# Patient Record
Sex: MALE | Race: WHITE | NOT HISPANIC OR LATINO | Employment: FULL TIME | URBAN - METROPOLITAN AREA
[De-identification: names, ages, dates, MRNs, and addresses within clinical notes are randomized per-mention and may not be internally consistent; named-entity substitution may affect disease eponyms.]

---

## 2017-08-18 DIAGNOSIS — F43.89 REACTION TO CHRONIC STRESS: ICD-10-CM

## 2017-08-18 RX ORDER — ESCITALOPRAM OXALATE 20 MG/1
TABLET ORAL
Qty: 15 TABLET | Refills: 0 | Status: SHIPPED | OUTPATIENT
Start: 2017-08-18 | End: 2017-09-25 | Stop reason: SDUPTHER

## 2017-08-25 DIAGNOSIS — F43.89 REACTION TO CHRONIC STRESS: ICD-10-CM

## 2017-08-25 RX ORDER — ESCITALOPRAM OXALATE 20 MG/1
TABLET ORAL
Qty: 15 TABLET | Refills: 0 | OUTPATIENT
Start: 2017-08-25

## 2017-09-25 DIAGNOSIS — F43.89 REACTION TO CHRONIC STRESS: ICD-10-CM

## 2017-09-25 RX ORDER — ESCITALOPRAM OXALATE 20 MG/1
TABLET ORAL
Qty: 15 TABLET | Refills: 0 | Status: SHIPPED | OUTPATIENT
Start: 2017-09-25 | End: 2017-11-07 | Stop reason: SDUPTHER

## 2017-11-07 ENCOUNTER — OFFICE VISIT (OUTPATIENT)
Dept: INTERNAL MEDICINE | Facility: CLINIC | Age: 37
End: 2017-11-07

## 2017-11-07 VITALS
SYSTOLIC BLOOD PRESSURE: 144 MMHG | WEIGHT: 220 LBS | DIASTOLIC BLOOD PRESSURE: 92 MMHG | HEART RATE: 64 BPM | BODY MASS INDEX: 30.68 KG/M2 | OXYGEN SATURATION: 99 %

## 2017-11-07 DIAGNOSIS — F43.89 REACTION TO CHRONIC STRESS: Primary | ICD-10-CM

## 2017-11-07 PROCEDURE — 99213 OFFICE O/P EST LOW 20 MIN: CPT | Performed by: INTERNAL MEDICINE

## 2017-11-07 RX ORDER — DEXTROAMPHETAMINE SACCHARATE, AMPHETAMINE ASPARTATE MONOHYDRATE, DEXTROAMPHETAMINE SULFATE AND AMPHETAMINE SULFATE 7.5; 7.5; 7.5; 7.5 MG/1; MG/1; MG/1; MG/1
CAPSULE, EXTENDED RELEASE ORAL
COMMUNITY
Start: 2017-10-11

## 2017-11-07 RX ORDER — ESCITALOPRAM OXALATE 20 MG/1
TABLET ORAL
Qty: 135 TABLET | Refills: 3 | Status: SHIPPED | OUTPATIENT
Start: 2017-11-07 | End: 2018-11-14 | Stop reason: SDUPTHER

## 2017-11-07 NOTE — PROGRESS NOTES
Follow-up (Chronic Stress)    Brett Parnell is a 37 y.o. male is here today for follow-up.    History of Present Illness   Has been started on adderall x 9 months. Feels much better on it.  Also lexapro helping. Had run out hence not feeling like he should, needs refills today.    Current Outpatient Prescriptions:   •  amphetamine-dextroamphetamine XR (ADDERALL XR) 30 MG 24 hr capsule, , Disp: , Rfl:   •  escitalopram (LEXAPRO) 20 MG tablet, Take 1 1/2 tablet by mouth daily., Disp: 135 tablet, Rfl: 3      The following portions of the patient's history were reviewed and updated as appropriate: allergies, current medications, past family history, past medical history, past social history, past surgical history and problem list.    Review of Systems   Constitutional: Positive for fatigue. Negative for chills and fever.   HENT: Negative for ear discharge, ear pain, sinus pressure and sore throat.    Respiratory: Negative for cough, chest tightness and shortness of breath.    Cardiovascular: Negative for chest pain, palpitations and leg swelling.   Gastrointestinal: Negative for diarrhea, nausea and vomiting.   Musculoskeletal: Negative for arthralgias, back pain and myalgias.   Neurological: Negative for dizziness, syncope and headaches.   Psychiatric/Behavioral: Negative for confusion and sleep disturbance. The patient is nervous/anxious.        Objective   /92  Pulse 64  Wt 220 lb (99.8 kg)  SpO2 99%  BMI 30.68 kg/m2  Physical Exam   Constitutional: He is oriented to person, place, and time. He appears well-developed and well-nourished.   HENT:   Head: Normocephalic and atraumatic.   Mouth/Throat: No oropharyngeal exudate.   Eyes: Conjunctivae are normal. Pupils are equal, round, and reactive to light.   Neck: Neck supple. No thyromegaly present.   Cardiovascular: Normal rate and regular rhythm.    Pulmonary/Chest: Effort normal and breath sounds normal.   Abdominal: Soft. Bowel sounds are normal.  He exhibits no distension. There is no tenderness.   Musculoskeletal: He exhibits no edema.   Neurological: He is alert and oriented to person, place, and time. No cranial nerve deficit.   Skin: Skin is warm and dry.   Psychiatric: He has a normal mood and affect. Judgment normal.   Nursing note and vitals reviewed.        No results found for this or any previous visit.          Assessment/Plan   Diagnoses and all orders for this visit:    Reaction to chronic stress  -     escitalopram (LEXAPRO) 20 MG tablet; Take 1 1/2 tablet by mouth daily.    Other orders  -     amphetamine-dextroamphetamine XR (ADDERALL XR) 30 MG 24 hr capsule;            Adv. Can get lexapro refills from hospitals [facility] as well.  Refilled this visit for a year.  RTC for physical as needed or at 41.    Return if symptoms worsen or fail to improve.

## 2018-11-14 DIAGNOSIS — F43.89 REACTION TO CHRONIC STRESS: ICD-10-CM

## 2018-11-14 RX ORDER — ESCITALOPRAM OXALATE 20 MG/1
TABLET ORAL
Qty: 45 TABLET | Refills: 0 | Status: SHIPPED | OUTPATIENT
Start: 2018-11-14 | End: 2018-11-14 | Stop reason: SDUPTHER

## 2018-11-14 RX ORDER — ESCITALOPRAM OXALATE 20 MG/1
TABLET ORAL
Qty: 45 TABLET | Refills: 0 | Status: SHIPPED | OUTPATIENT
Start: 2018-11-14 | End: 2018-12-17 | Stop reason: SDUPTHER

## 2018-12-17 DIAGNOSIS — F43.89 REACTION TO CHRONIC STRESS: ICD-10-CM

## 2018-12-18 RX ORDER — ESCITALOPRAM OXALATE 20 MG/1
TABLET ORAL
Qty: 45 TABLET | Refills: 0 | Status: SHIPPED | OUTPATIENT
Start: 2018-12-18

## 2021-01-25 ENCOUNTER — IMMUNIZATION (OUTPATIENT)
Dept: VACCINE CLINIC | Facility: HOSPITAL | Age: 41
End: 2021-01-25

## 2021-01-25 PROCEDURE — 0001A: CPT | Performed by: INTERNAL MEDICINE

## 2021-01-25 PROCEDURE — 91300 HC SARSCOV02 VAC 30MCG/0.3ML IM: CPT | Performed by: INTERNAL MEDICINE

## 2021-02-15 ENCOUNTER — IMMUNIZATION (OUTPATIENT)
Dept: VACCINE CLINIC | Facility: HOSPITAL | Age: 41
End: 2021-02-15

## 2021-02-15 PROCEDURE — 91300 HC SARSCOV02 VAC 30MCG/0.3ML IM: CPT | Performed by: PHYSICIAN ASSISTANT

## 2021-02-15 PROCEDURE — 0002A: CPT | Performed by: PHYSICIAN ASSISTANT

## 2024-03-21 ENCOUNTER — TELEPHONE (OUTPATIENT)
Dept: INTERNAL MEDICINE | Facility: CLINIC | Age: 44
End: 2024-03-21

## 2024-03-21 NOTE — TELEPHONE ENCOUNTER
Caller: Matthew Parnell    Relationship to patient: Self    Best call back number: [phone]    Patient is needing: PATIENT STATED THAT HE KNOWS THAT PROVIDER IS NOT ACCEPTING PATIENTS; WOULD LIKE TO ASK IF PROVIDER WOULD CONSIDERING LAST TIME PATIENT WAS IN OFFICE WAS 2017

## 2024-03-27 NOTE — TELEPHONE ENCOUNTER
Sorry for the delay. Can definitely see him again. Is he okay to wait until September for a new appointment?    thanks

## 2024-04-22 ENCOUNTER — TELEPHONE (OUTPATIENT)
Dept: INTERNAL MEDICINE | Facility: CLINIC | Age: 44
End: 2024-04-22
Payer: COMMERCIAL

## 2024-04-22 NOTE — TELEPHONE ENCOUNTER
Caller: Matthew Parnell    Relationship: Self    Best call back number: [phone]     What orders are you requesting (i.e. lab or imaging): FASTING LABS     In what timeframe would the patient need to come in: BEFORE 5/29/24    Where will you receive your lab/imaging services: IN OFFICE     Additional notes: PATIENT WILL BE ESTABLISHING CARE WITH ALONA JIMENEZ ON 5/29/24 AT 10:45 AM AND WOULD LIKE TO HAVE LABS DRAWN PRIOR TO HIS APPOINTMENT. PATIENT WOULD ALSO LIKE TO REQUEST TESTING FOR PROSTATE CANCER. PATIENT STATES HIS FATHER WAS DIAGNOSED IN HIS 50'S OF PROSTATE CANCER.

## 2024-04-29 DIAGNOSIS — Z13.220 SCREENING FOR HYPERLIPIDEMIA: ICD-10-CM

## 2024-04-29 DIAGNOSIS — Z13.29 SCREENING FOR HYPOTHYROIDISM: ICD-10-CM

## 2024-04-29 DIAGNOSIS — Z12.5 SCREENING FOR PROSTATE CANCER: ICD-10-CM

## 2024-04-29 DIAGNOSIS — Z13.1 SCREENING FOR DIABETES MELLITUS (DM): ICD-10-CM

## 2024-04-29 DIAGNOSIS — Z00.00 HEALTH CARE MAINTENANCE: Primary | ICD-10-CM

## 2024-05-09 ENCOUNTER — LAB (OUTPATIENT)
Dept: LAB | Facility: HOSPITAL | Age: 44
End: 2024-05-09
Payer: COMMERCIAL

## 2024-05-09 DIAGNOSIS — Z00.00 HEALTH CARE MAINTENANCE: ICD-10-CM

## 2024-05-09 DIAGNOSIS — Z12.5 SCREENING FOR PROSTATE CANCER: ICD-10-CM

## 2024-05-09 DIAGNOSIS — Z13.220 SCREENING FOR HYPERLIPIDEMIA: ICD-10-CM

## 2024-05-09 DIAGNOSIS — Z13.29 SCREENING FOR HYPOTHYROIDISM: ICD-10-CM

## 2024-05-09 DIAGNOSIS — Z13.1 SCREENING FOR DIABETES MELLITUS (DM): ICD-10-CM

## 2024-05-09 LAB
ALBUMIN SERPL-MCNC: 4.5 G/DL (ref 3.5–5.2)
ALBUMIN/GLOB SERPL: 1.9 G/DL
ALP SERPL-CCNC: 73 U/L (ref 39–117)
ALT SERPL W P-5'-P-CCNC: 35 U/L (ref 1–41)
ANION GAP SERPL CALCULATED.3IONS-SCNC: 10.1 MMOL/L (ref 5–15)
AST SERPL-CCNC: 23 U/L (ref 1–40)
BILIRUB SERPL-MCNC: 0.9 MG/DL (ref 0–1.2)
BUN SERPL-MCNC: 15 MG/DL (ref 6–20)
BUN/CREAT SERPL: 10.8 (ref 7–25)
CALCIUM SPEC-SCNC: 9.5 MG/DL (ref 8.6–10.5)
CHLORIDE SERPL-SCNC: 107 MMOL/L (ref 98–107)
CHOLEST SERPL-MCNC: 252 MG/DL (ref 0–200)
CO2 SERPL-SCNC: 24.9 MMOL/L (ref 22–29)
CREAT SERPL-MCNC: 1.39 MG/DL (ref 0.76–1.27)
DEPRECATED RDW RBC AUTO: 40.9 FL (ref 37–54)
EGFRCR SERPLBLD CKD-EPI 2021: 64.5 ML/MIN/1.73
ERYTHROCYTE [DISTWIDTH] IN BLOOD BY AUTOMATED COUNT: 12.1 % (ref 12.3–15.4)
GLOBULIN UR ELPH-MCNC: 2.4 GM/DL
GLUCOSE SERPL-MCNC: 87 MG/DL (ref 65–99)
HBA1C MFR BLD: 5 % (ref 4.8–5.6)
HCT VFR BLD AUTO: 45.2 % (ref 37.5–51)
HDLC SERPL-MCNC: 48 MG/DL (ref 40–60)
HGB BLD-MCNC: 16.1 G/DL (ref 13–17.7)
LDLC SERPL CALC-MCNC: 172 MG/DL (ref 0–100)
LDLC/HDLC SERPL: 3.54 {RATIO}
MCH RBC QN AUTO: 32.9 PG (ref 26.6–33)
MCHC RBC AUTO-ENTMCNC: 35.6 G/DL (ref 31.5–35.7)
MCV RBC AUTO: 92.2 FL (ref 79–97)
PLATELET # BLD AUTO: 198 10*3/MM3 (ref 140–450)
PMV BLD AUTO: 11.4 FL (ref 6–12)
POTASSIUM SERPL-SCNC: 4.1 MMOL/L (ref 3.5–5.2)
PROT SERPL-MCNC: 6.9 G/DL (ref 6–8.5)
PSA SERPL-MCNC: 0.4 NG/ML (ref 0–4)
RBC # BLD AUTO: 4.9 10*6/MM3 (ref 4.14–5.8)
SODIUM SERPL-SCNC: 142 MMOL/L (ref 136–145)
TRIGL SERPL-MCNC: 171 MG/DL (ref 0–150)
TSH SERPL DL<=0.05 MIU/L-ACNC: 3.67 UIU/ML (ref 0.27–4.2)
VLDLC SERPL-MCNC: 32 MG/DL (ref 5–40)
WBC NRBC COR # BLD AUTO: 3.92 10*3/MM3 (ref 3.4–10.8)

## 2024-05-09 PROCEDURE — 83036 HEMOGLOBIN GLYCOSYLATED A1C: CPT

## 2024-05-09 PROCEDURE — 84443 ASSAY THYROID STIM HORMONE: CPT

## 2024-05-09 PROCEDURE — 36415 COLL VENOUS BLD VENIPUNCTURE: CPT

## 2024-05-09 PROCEDURE — 85027 COMPLETE CBC AUTOMATED: CPT

## 2024-05-09 PROCEDURE — 80053 COMPREHEN METABOLIC PANEL: CPT

## 2024-05-09 PROCEDURE — G0103 PSA SCREENING: HCPCS

## 2024-05-09 PROCEDURE — 80061 LIPID PANEL: CPT

## 2024-06-13 ENCOUNTER — OFFICE VISIT (OUTPATIENT)
Dept: INTERNAL MEDICINE | Facility: CLINIC | Age: 44
End: 2024-06-13
Payer: COMMERCIAL

## 2024-06-13 VITALS
BODY MASS INDEX: 36.11 KG/M2 | TEMPERATURE: 98 F | HEIGHT: 70 IN | OXYGEN SATURATION: 98 % | DIASTOLIC BLOOD PRESSURE: 90 MMHG | WEIGHT: 252.2 LBS | HEART RATE: 85 BPM | SYSTOLIC BLOOD PRESSURE: 130 MMHG

## 2024-06-13 DIAGNOSIS — R79.89 ELEVATED SERUM CREATININE: ICD-10-CM

## 2024-06-13 DIAGNOSIS — Z00.00 ANNUAL PHYSICAL EXAM: ICD-10-CM

## 2024-06-13 DIAGNOSIS — Z80.42 FAMILY HISTORY OF PROSTATE CANCER: ICD-10-CM

## 2024-06-13 DIAGNOSIS — E78.5 HYPERLIPIDEMIA LDL GOAL <100: ICD-10-CM

## 2024-06-13 DIAGNOSIS — E78.2 MIXED HYPERLIPIDEMIA: ICD-10-CM

## 2024-06-13 DIAGNOSIS — R03.0 ELEVATED BLOOD PRESSURE READING: ICD-10-CM

## 2024-06-13 DIAGNOSIS — Z76.89 ENCOUNTER TO ESTABLISH CARE: Primary | ICD-10-CM

## 2024-06-13 PROCEDURE — 99386 PREV VISIT NEW AGE 40-64: CPT | Performed by: NURSE PRACTITIONER

## 2024-06-13 NOTE — PROGRESS NOTES
Office Note     Name: Matthew Parnell    : 1980     MRN: [id number]     Chief Complaint  Establish Care (physical)    Subjective     History of Present Illness:  Matthew Parnell is a 44 y.o. male who presents today for annual physical exam.    The patient is being seen for a health maintenance evaluation.    History reviewed. No pertinent past medical history.    History reviewed. No pertinent surgical history.    Social History     Socioeconomic History    Marital status:    Tobacco Use    Smoking status: Never    Smokeless tobacco: Never   Vaping Use    Vaping status: Never Used   Substance and Sexual Activity    Alcohol use: Yes     Comment: 1-2 days a week    Drug use: No         Current Outpatient Medications:     escitalopram (LEXAPRO) 20 MG tablet, TAKE 1 1/2 TABLET BY MOUTH EVERY DAY, Disp: 45 tablet, Rfl: 0    General History  Matthew  does have regular dental visits.  He does not complain of vision problems. Last eye exam was 2-3 years.  Immunizations are up to date. The patient needs the following immunizations: None at this time.    Lifestyle  Matthew  consumes a  average diet. Does not eat a lot of dairy or condiments .  He exercises intermittently. [employer] 4-5 days weekly up until about 7 months ago. Walks with family.    Reproductive Health  Matthew  is sexually active. His contraceptive plan is vasectomy.   He does not have erectile dysfunction.     Screening  Last PSA was .  Last prostate exam was never. Family history of prostate cancer: Father with prostate cancer in mid 50's.  Last testicular exam was self.   Last colonoscopy was never.  Last Completed Colonoscopy       This patient has no relevant Health Maintenance data.        . Family history of colon cancer: No FH.  Other pertinent family history and/or screenings: None at this time.    Review of Systems   Constitutional: Negative.    HENT: Negative.     Respiratory: Negative.     Cardiovascular: Negative.    Gastrointestinal:  "Negative.    Genitourinary: Negative.    Musculoskeletal: Negative.    Neurological: Negative.    Psychiatric/Behavioral: Negative.        Latest Reference Range & Units 05/09/24 09:57   Sodium 136 - 145 mmol/L 142   Potassium 3.5 - 5.2 mmol/L 4.1   Chloride 98 - 107 mmol/L 107   CO2 22.0 - 29.0 mmol/L 24.9   Anion Gap 5.0 - 15.0 mmol/L 10.1   BUN 6 - 20 mg/dL 15   Creatinine 0.76 - 1.27 mg/dL 1.39 (H)   BUN/Creatinine Ratio 7.0 - 25.0  10.8   eGFR >60.0 mL/min/1.73 64.5   Glucose 65 - 99 mg/dL 87   Calcium 8.6 - 10.5 mg/dL 9.5   Alkaline Phosphatase 39 - 117 U/L 73   Total Protein 6.0 - 8.5 g/dL 6.9   Albumin 3.5 - 5.2 g/dL 4.5   Globulin gm/dL 2.4   A/G Ratio g/dL 1.9   AST (SGOT) 1 - 40 U/L 23   ALT (SGPT) 1 - 41 U/L 35   Total Bilirubin 0.0 - 1.2 mg/dL 0.9   Hemoglobin A1C 4.80 - 5.60 % 5.00   TSH Baseline 0.270 - 4.200 uIU/mL 3.670   Total Cholesterol 0 - 200 mg/dL 252 (H)   HDL Cholesterol 40 - 60 mg/dL 48   LDL Cholesterol  0 - 100 mg/dL 172 (H)   VLDL Cholesterol 5 - 40 mg/dL 32   Triglycerides 0 - 150 mg/dL 171 (H)   LDL/HDL Ratio  3.54   WBC 3.40 - 10.80 10*3/mm3 3.92   RBC 4.14 - 5.80 10*6/mm3 4.90   Hemoglobin 13.0 - 17.7 g/dL 16.1   Hematocrit 37.5 - 51.0 % 45.2   Platelets 140 - 450 10*3/mm3 198   RDW 12.3 - 15.4 % 12.1 (L)   MCV 79.0 - 97.0 fL 92.2   MCH 26.6 - 33.0 pg 32.9   MCHC 31.5 - 35.7 g/dL 35.6   MPV 6.0 - 12.0 fL 11.4   RDW-SD 37.0 - 54.0 fl 40.9   PSA 0.000 - 4.000 ng/mL 0.402   (H): Data is abnormally high  (L): Data is abnormally low    Objective     Vital Signs  /90   Pulse 85   Temp 98 °F (36.7 °C)   Ht 177.8 cm (70\")   Wt 114 kg (252 lb 3.2 oz)   SpO2 98%   BMI 36.19 kg/m²   Estimated body mass index is 36.19 kg/m² as calculated from the following:    Height as of this encounter: 177.8 cm (70\").    Weight as of this encounter: 114 kg (252 lb 3.2 oz).    Class 2 Severe Obesity (BMI >=35 and <=39.9). Obesity-related health conditions include the following: dyslipidemias. " Obesity is unchanged. BMI is is above average; BMI management plan is completed. We discussed portion control and increasing exercise.       Physical Exam  Constitutional:       General: He is awake. He is not in acute distress.     Appearance: Normal appearance. He is well-developed and well-groomed. He is not ill-appearing.   HENT:      Head: Normocephalic and atraumatic.      Right Ear: Tympanic membrane, ear canal and external ear normal.      Left Ear: Tympanic membrane, ear canal and external ear normal.      Nose: Nose normal.      Mouth/Throat:      Mouth: Mucous membranes are moist.      Pharynx: Oropharynx is clear.   Eyes:      General: No scleral icterus.     Extraocular Movements: Extraocular movements intact.      Conjunctiva/sclera: Conjunctivae normal.      Pupils: Pupils are equal, round, and reactive to light.   Neck:      Trachea: Trachea normal.   Cardiovascular:      Rate and Rhythm: Normal rate and regular rhythm.      Pulses: Normal pulses.      Heart sounds: Normal heart sounds. No murmur heard.  Pulmonary:      Effort: Pulmonary effort is normal. No tachypnea, accessory muscle usage or respiratory distress.      Breath sounds: Normal breath sounds. No wheezing, rhonchi or rales.   Abdominal:      General: Abdomen is flat. Bowel sounds are normal. There is no distension.      Palpations: Abdomen is soft.      Tenderness: There is no abdominal tenderness.   Genitourinary:     Comments: Deferred  Musculoskeletal:         General: No swelling. Normal range of motion.      Cervical back: Normal range of motion and neck supple. No tenderness.      Right lower leg: No edema.      Left lower leg: No edema.   Skin:     General: Skin is warm and dry.      Capillary Refill: Capillary refill takes less than 2 seconds.      Coloration: Skin is not jaundiced or pale.      Findings: No lesion or rash.   Neurological:      General: No focal deficit present.      Mental Status: He is alert and oriented to  person, place, and time. Mental status is at baseline.      Motor: No weakness.      Gait: Gait is intact.   Psychiatric:         Attention and Perception: Attention normal.         Mood and Affect: Mood normal.         Speech: Speech normal.         Behavior: Behavior normal. Behavior is cooperative.         Thought Content: Thought content normal.         Judgment: Judgment normal.          Assessment and Plan     Diagnoses and all orders for this visit:    1. Encounter to establish care (Primary)    2. Annual physical exam    3. Elevated serum creatinine  -     Basic metabolic panel; Future    4. Mixed hyperlipidemia  -     Lipid Panel; Future    5. Hyperlipidemia LDL goal <100  -     Lipid Panel; Future    6. Family history of prostate cancer    7. Elevated blood pressure reading        Plan:  Establish care.    Annual physical exam.  Lab results previously done.  Results reviewed with the patient during today's visit.  Discussed with patient dietary and lifestyle modifications to assist with blood pressure reduction as well as lowering cholesterol numbers.  Serum creatinine slightly elevated on labs.  Patient was fasting and had not had any water that morning.  Will recheck in 3 months with fasting lipid panel.  Patient advised to have at least 1 bottle of water prior to labs.  Colorectal cancer screening to start at age 45.  Vaccinations up-to-date.  Continue with up to date immunizations and health maintenance screenings.  Continue with healthy lifestyle choices such as healthy diet and eating habits and regular exercise routine.  Continue with adequate oral hydration and rest.  Annual physical exam in 1 year.  Will base possible 3-month follow-up on repeat labs.    Follow Up  Return in about 1 year (around 6/13/2025) for Annual.    OKSANA Franco    Part of this note may be an electronic transcription/translation of spoken language to printed text using the Dragon Dictation System.

## 2024-06-14 ENCOUNTER — PATIENT ROUNDING (BHMG ONLY) (OUTPATIENT)
Dept: INTERNAL MEDICINE | Facility: CLINIC | Age: 44
End: 2024-06-14
Payer: COMMERCIAL

## 2024-06-14 NOTE — PROGRESS NOTES
A My-chart message has been sent to the patient for [facility].

## 2025-08-01 ENCOUNTER — TELEPHONE (OUTPATIENT)
Dept: INTERNAL MEDICINE | Facility: CLINIC | Age: 45
End: 2025-08-01
Payer: COMMERCIAL

## 2025-08-01 NOTE — TELEPHONE ENCOUNTER
HUB TO RELAY       LEFT VOICEMAIL LETTING PATIENT KNOW THAT THE PROVIDER WILL BE OUT OF THE OFFICE ON 08/13/2025 AND TO GIVE US A CALL BACK TO RESCHEDULE.     PATIENT CAN BE SCHEDULED IN THE NEXT OPENING THAT WORKS WITH THEIR SCHEDULE.

## 2025-08-20 ENCOUNTER — LAB (OUTPATIENT)
Dept: LAB | Facility: HOSPITAL | Age: 45
End: 2025-08-20
Payer: COMMERCIAL

## 2025-08-20 PROCEDURE — G0103 PSA SCREENING: HCPCS | Performed by: NURSE PRACTITIONER

## 2025-08-20 PROCEDURE — 81003 URINALYSIS AUTO W/O SCOPE: CPT | Performed by: NURSE PRACTITIONER

## 2025-08-20 PROCEDURE — 85027 COMPLETE CBC AUTOMATED: CPT | Performed by: NURSE PRACTITIONER

## 2025-08-20 PROCEDURE — 80053 COMPREHEN METABOLIC PANEL: CPT | Performed by: NURSE PRACTITIONER

## 2025-08-20 PROCEDURE — 84443 ASSAY THYROID STIM HORMONE: CPT | Performed by: NURSE PRACTITIONER

## 2025-08-20 PROCEDURE — 80061 LIPID PANEL: CPT | Performed by: NURSE PRACTITIONER

## 2025-08-20 PROCEDURE — 83036 HEMOGLOBIN GLYCOSYLATED A1C: CPT | Performed by: NURSE PRACTITIONER
